# Patient Record
Sex: MALE | Race: WHITE | NOT HISPANIC OR LATINO | URBAN - METROPOLITAN AREA
[De-identification: names, ages, dates, MRNs, and addresses within clinical notes are randomized per-mention and may not be internally consistent; named-entity substitution may affect disease eponyms.]

---

## 2020-10-01 ENCOUNTER — OUTPATIENT (OUTPATIENT)
Dept: OUTPATIENT SERVICES | Facility: HOSPITAL | Age: 50
LOS: 1 days | End: 2020-10-01
Payer: COMMERCIAL

## 2020-10-01 ENCOUNTER — APPOINTMENT (OUTPATIENT)
Dept: RADIOLOGY | Facility: CLINIC | Age: 50
End: 2020-10-01
Payer: COMMERCIAL

## 2020-10-01 DIAGNOSIS — Z00.8 ENCOUNTER FOR OTHER GENERAL EXAMINATION: ICD-10-CM

## 2020-10-01 PROBLEM — Z00.00 ENCOUNTER FOR PREVENTIVE HEALTH EXAMINATION: Status: ACTIVE | Noted: 2020-10-01

## 2020-10-01 PROCEDURE — 73130 X-RAY EXAM OF HAND: CPT

## 2020-10-01 PROCEDURE — 73130 X-RAY EXAM OF HAND: CPT | Mod: 26,RT

## 2020-10-05 ENCOUNTER — LABORATORY RESULT (OUTPATIENT)
Age: 50
End: 2020-10-05

## 2020-10-05 ENCOUNTER — APPOINTMENT (OUTPATIENT)
Dept: ORTHOPEDIC SURGERY | Facility: CLINIC | Age: 50
End: 2020-10-05
Payer: COMMERCIAL

## 2020-10-05 VITALS — WEIGHT: 200 LBS | HEIGHT: 71 IN | BODY MASS INDEX: 28 KG/M2 | RESPIRATION RATE: 16 BRPM

## 2020-10-05 DIAGNOSIS — Z78.9 OTHER SPECIFIED HEALTH STATUS: ICD-10-CM

## 2020-10-05 DIAGNOSIS — Z87.891 PERSONAL HISTORY OF NICOTINE DEPENDENCE: ICD-10-CM

## 2020-10-05 PROCEDURE — 99203 OFFICE O/P NEW LOW 30 MIN: CPT

## 2020-10-05 PROCEDURE — 73130 X-RAY EXAM OF HAND: CPT | Mod: 50

## 2020-10-06 ENCOUNTER — TRANSCRIPTION ENCOUNTER (OUTPATIENT)
Age: 50
End: 2020-10-06

## 2020-10-06 RX ORDER — CEPHALEXIN 500 MG/1
500 CAPSULE ORAL 4 TIMES DAILY
Qty: 20 | Refills: 0 | Status: ACTIVE | COMMUNITY
Start: 2020-10-06 | End: 1900-01-01

## 2020-10-06 RX ORDER — ACETAMINOPHEN AND CODEINE 300; 30 MG/1; MG/1
300-30 TABLET ORAL
Qty: 12 | Refills: 0 | Status: ACTIVE | COMMUNITY
Start: 2020-10-06 | End: 1900-01-01

## 2020-10-07 ENCOUNTER — APPOINTMENT (OUTPATIENT)
Dept: ORTHOPEDIC SURGERY | Facility: AMBULATORY SURGERY CENTER | Age: 50
End: 2020-10-07
Payer: COMMERCIAL

## 2020-10-07 ENCOUNTER — OUTPATIENT (OUTPATIENT)
Dept: OUTPATIENT SERVICES | Facility: HOSPITAL | Age: 50
LOS: 1 days | Discharge: HOME | End: 2020-10-07

## 2020-10-07 PROCEDURE — 26608 TREAT METACARPAL FRACTURE: CPT | Mod: F9

## 2020-10-14 ENCOUNTER — APPOINTMENT (OUTPATIENT)
Dept: ORTHOPEDIC SURGERY | Facility: CLINIC | Age: 50
End: 2020-10-14
Payer: COMMERCIAL

## 2020-10-14 PROCEDURE — 99024 POSTOP FOLLOW-UP VISIT: CPT

## 2020-10-14 PROCEDURE — 29075 APPL CST ELBW FNGR SHORT ARM: CPT

## 2020-10-14 PROCEDURE — 73130 X-RAY EXAM OF HAND: CPT | Mod: RT

## 2020-11-05 ENCOUNTER — APPOINTMENT (OUTPATIENT)
Dept: ORTHOPEDIC SURGERY | Facility: CLINIC | Age: 50
End: 2020-11-05
Payer: COMMERCIAL

## 2020-11-05 VITALS — RESPIRATION RATE: 16 BRPM | HEIGHT: 71 IN | WEIGHT: 200 LBS | BODY MASS INDEX: 28 KG/M2

## 2020-11-05 DIAGNOSIS — S62.336D DISPLACED FRACTURE OF NECK OF FIFTH METACARPAL BONE, RIGHT HAND, SUBSEQUENT ENCOUNTER FOR FRACTURE WITH ROUTINE HEALING: ICD-10-CM

## 2020-11-05 PROCEDURE — 73130 X-RAY EXAM OF HAND: CPT | Mod: RT

## 2020-11-05 PROCEDURE — 20670 REMOVAL IMPLANT SUPERFICIAL: CPT | Mod: 58,RT

## 2020-11-05 PROCEDURE — 99024 POSTOP FOLLOW-UP VISIT: CPT

## 2022-02-24 ENCOUNTER — EMERGENCY (EMERGENCY)
Facility: HOSPITAL | Age: 52
LOS: 1 days | Discharge: HOME | End: 2022-02-24
Attending: PERSONAL EMERGENCY RESPONSE ATTENDANT
Payer: COMMERCIAL

## 2022-02-24 VITALS
TEMPERATURE: 98 F | RESPIRATION RATE: 18 BRPM | OXYGEN SATURATION: 98 % | WEIGHT: 199.96 LBS | SYSTOLIC BLOOD PRESSURE: 133 MMHG | HEART RATE: 63 BPM | DIASTOLIC BLOOD PRESSURE: 88 MMHG

## 2022-02-24 LAB
ALBUMIN SERPL ELPH-MCNC: 4.4 G/DL — SIGNIFICANT CHANGE UP (ref 3.3–5)
ALP SERPL-CCNC: 54 U/L — SIGNIFICANT CHANGE UP (ref 40–120)
ALT FLD-CCNC: 167 U/L — HIGH (ref 10–45)
ANION GAP SERPL CALC-SCNC: 14 MMOL/L — SIGNIFICANT CHANGE UP (ref 5–17)
AST SERPL-CCNC: 65 U/L — HIGH (ref 10–40)
BASOPHILS # BLD AUTO: 0.04 K/UL — SIGNIFICANT CHANGE UP (ref 0–0.2)
BASOPHILS NFR BLD AUTO: 0.6 % — SIGNIFICANT CHANGE UP (ref 0–2)
BILIRUB SERPL-MCNC: 0.4 MG/DL — SIGNIFICANT CHANGE UP (ref 0.2–1.2)
BUN SERPL-MCNC: 12 MG/DL — SIGNIFICANT CHANGE UP (ref 7–23)
CALCIUM SERPL-MCNC: 9.5 MG/DL — SIGNIFICANT CHANGE UP (ref 8.4–10.5)
CHLORIDE SERPL-SCNC: 103 MMOL/L — SIGNIFICANT CHANGE UP (ref 96–108)
CO2 SERPL-SCNC: 21 MMOL/L — LOW (ref 22–31)
CREAT SERPL-MCNC: 0.85 MG/DL — SIGNIFICANT CHANGE UP (ref 0.5–1.3)
EOSINOPHIL # BLD AUTO: 0.07 K/UL — SIGNIFICANT CHANGE UP (ref 0–0.5)
EOSINOPHIL NFR BLD AUTO: 1 % — SIGNIFICANT CHANGE UP (ref 0–6)
FLUAV AG NPH QL: SIGNIFICANT CHANGE UP
FLUBV AG NPH QL: SIGNIFICANT CHANGE UP
GLUCOSE SERPL-MCNC: 84 MG/DL — SIGNIFICANT CHANGE UP (ref 70–99)
HCT VFR BLD CALC: 46.7 % — SIGNIFICANT CHANGE UP (ref 39–50)
HGB BLD-MCNC: 15.5 G/DL — SIGNIFICANT CHANGE UP (ref 13–17)
IMM GRANULOCYTES NFR BLD AUTO: 0.6 % — SIGNIFICANT CHANGE UP (ref 0–1.5)
LIDOCAIN IGE QN: 26 U/L — SIGNIFICANT CHANGE UP (ref 7–60)
LYMPHOCYTES # BLD AUTO: 2.41 K/UL — SIGNIFICANT CHANGE UP (ref 1–3.3)
LYMPHOCYTES # BLD AUTO: 33.3 % — SIGNIFICANT CHANGE UP (ref 13–44)
MCHC RBC-ENTMCNC: 29.5 PG — SIGNIFICANT CHANGE UP (ref 27–34)
MCHC RBC-ENTMCNC: 33.2 GM/DL — SIGNIFICANT CHANGE UP (ref 32–36)
MCV RBC AUTO: 88.8 FL — SIGNIFICANT CHANGE UP (ref 80–100)
MONOCYTES # BLD AUTO: 0.55 K/UL — SIGNIFICANT CHANGE UP (ref 0–0.9)
MONOCYTES NFR BLD AUTO: 7.6 % — SIGNIFICANT CHANGE UP (ref 2–14)
NEUTROPHILS # BLD AUTO: 4.12 K/UL — SIGNIFICANT CHANGE UP (ref 1.8–7.4)
NEUTROPHILS NFR BLD AUTO: 56.9 % — SIGNIFICANT CHANGE UP (ref 43–77)
NRBC # BLD: 0 /100 WBCS — SIGNIFICANT CHANGE UP (ref 0–0)
PLATELET # BLD AUTO: 205 K/UL — SIGNIFICANT CHANGE UP (ref 150–400)
POTASSIUM SERPL-MCNC: 4.2 MMOL/L — SIGNIFICANT CHANGE UP (ref 3.5–5.3)
POTASSIUM SERPL-SCNC: 4.2 MMOL/L — SIGNIFICANT CHANGE UP (ref 3.5–5.3)
PROT SERPL-MCNC: 6.8 G/DL — SIGNIFICANT CHANGE UP (ref 6–8.3)
RBC # BLD: 5.26 M/UL — SIGNIFICANT CHANGE UP (ref 4.2–5.8)
RBC # FLD: 12.5 % — SIGNIFICANT CHANGE UP (ref 10.3–14.5)
RSV RNA NPH QL NAA+NON-PROBE: SIGNIFICANT CHANGE UP
SARS-COV-2 RNA SPEC QL NAA+PROBE: SIGNIFICANT CHANGE UP
SODIUM SERPL-SCNC: 138 MMOL/L — SIGNIFICANT CHANGE UP (ref 135–145)
TROPONIN T, HIGH SENSITIVITY RESULT: <6 NG/L — SIGNIFICANT CHANGE UP (ref 0–51)
TROPONIN T, HIGH SENSITIVITY RESULT: <6 NG/L — SIGNIFICANT CHANGE UP (ref 0–51)
WBC # BLD: 7.23 K/UL — SIGNIFICANT CHANGE UP (ref 3.8–10.5)
WBC # FLD AUTO: 7.23 K/UL — SIGNIFICANT CHANGE UP (ref 3.8–10.5)

## 2022-02-24 PROCEDURE — 71046 X-RAY EXAM CHEST 2 VIEWS: CPT | Mod: 26

## 2022-02-24 PROCEDURE — 99220: CPT

## 2022-02-24 PROCEDURE — 93010 ELECTROCARDIOGRAM REPORT: CPT | Mod: 59

## 2022-02-24 RX ORDER — FAMOTIDINE 10 MG/ML
20 INJECTION INTRAVENOUS ONCE
Refills: 0 | Status: COMPLETED | OUTPATIENT
Start: 2022-02-24 | End: 2022-02-24

## 2022-02-24 RX ORDER — ASPIRIN/CALCIUM CARB/MAGNESIUM 324 MG
162 TABLET ORAL ONCE
Refills: 0 | Status: COMPLETED | OUTPATIENT
Start: 2022-02-24 | End: 2022-02-24

## 2022-02-24 RX ADMIN — FAMOTIDINE 20 MILLIGRAM(S): 10 INJECTION INTRAVENOUS at 16:07

## 2022-02-24 NOTE — ED CDU PROVIDER DISPOSITION NOTE - PATIENT PORTAL LINK FT
You can access the FollowMyHealth Patient Portal offered by [facility] by registering at the following website: [email or web address]. By joining [facility]’s FollowMyHealth portal, you will also be able to view your health information using other applications (apps) compatible with our system.

## 2022-02-24 NOTE — ED CDU PROVIDER DISPOSITION NOTE - ATTENDING CONTRIBUTION TO CARE
Seen in [facility] CDU 50    51M with PMH/PSH including HLD, former tobacco sent to the CDU after presenting to the ED with chest pressure.  Reports chest pressure improved but not resolved.  Reports initial chest pressure accompanied by lightheadedness, weakness going up stairs and tingling down bilateral UEs to ring and pinky fingers.  Reports still having intermittent chest pressure, although improved.  Denies shortness of breath, palpitations, UEs pain, neck pain, jaw pain, leg swelling.  Denies abdominal pain, nausea, vomiting, diarrhea.  Denies urinary complaints.  Denies fevers.  A ten (10) point review of systems was negative other than as stated in the HPI or elsewhere in the chart.    Exam:   General: NAD  HENT: head NCAT, airway patent  Eyes: anicteric, no conjunctival injection   Lungs: lungs CTAB with good inspiratory effort, no wheezing, no rhonchi, no rales  Cardiac: +S1S2, no m/r/g  GI: abdomen soft with +BS, NT, ND  : no CVAT  MSK: FROM at neck, no tenderness to midline palpation, no calf tenderness, swelling, erythema or warmth  Neuro: moving all extremities spontaneously, sensory grossly intact, no gross neuro deficits  Psych: normal mood and affect     A/P: 51M with chest pressure at rest, CT coronary non actionable with mild CAD, no events on tele, seen by Cards, patient to follow up with outpatient cards.  No acute issues at  this time.  Lab and radiology tests reviewed with patient.  Patient stable for discharge. Follow up instructions given, importance of follow up emphasized, return to ED parameters reviewed and patient verbalized understanding.  All questions answered, all concerns addressed.

## 2022-02-24 NOTE — ED ADULT NURSE NOTE - NSIMPLEMENTINTERV_GEN_ALL_ED
Implemented All Universal Safety Interventions:  [place] to call system. Call bell, personal items and telephone within reach. Instruct patient to call for assistance. Room bathroom lighting operational. Non-slip footwear when patient is off stretcher. Physically safe environment: no spills, clutter or unnecessary equipment. Stretcher in lowest position, wheels locked, appropriate side rails in place.

## 2022-02-24 NOTE — ED CDU PROVIDER INITIAL DAY NOTE - OBJECTIVE STATEMENT
52 yo male a PMH of HLD (not on meds) 10 pack yr smoker over 15 yrs ago and FH of MI in father at 45 who presents with 2 days of midsternal chest pressure that radiates to the right, is constant, nonexertional, associated with indigestion and dizziness. Sxs are intermittent and come/go w/o provoking factors. Denies any sob, no diaphoreses, n/v. no f/c. Pt took aspirin today and yesterday. Has never been evaluated by Cards. 52 yo male a PMH of HLD (not on meds) 10 pack yr smoker over 15 yrs ago and FH of MI in father at 45 who presents with 2 days of midsternal chest pressure that radiates to the right, is constant, nonexertional, associated with indigestion and dizziness. Sxs are intermittent and come/go w/o provoking factors. Denies any sob, no diaphoreses. Denies nausea, vomiting, fever, chills, abd pain, headache, dizziness, syncope.  Pt took aspirin today and yesterday. Has never been evaluated by Cards. Denies drugs or alcohol use.

## 2022-02-24 NOTE — ED ADULT NURSE NOTE - OBJECTIVE STATEMENT
pt is a 51yoM complaining of chest pressure that started yesterday after leaving work. 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 States he was not doing anything when it started and also felt some tingling in his hands. Denies SOB, smoking or drinking history. Does not take any medications. Pt is AAOX4 and appears well.

## 2022-02-24 NOTE — ED ADULT NURSE REASSESSMENT NOTE - COMFORT CARE
ambulated to bathroom/darkened lights/meal provided/plan of care explained/po fluids offered/repositioned/side rails up/warm blanket provided

## 2022-02-24 NOTE — ED CDU PROVIDER INITIAL DAY NOTE - ATTENDING CONTRIBUTION TO CARE
Attending MD Suarez.  Agree with above.  Pt is an otherwise healthy 52 yo male with complaint of ~2 days chest pressure radiating from mid-sternum to R side of chest/R arm assoc with light-headedness.  No diaphoresis, no n/v.  Exam reassuring, non-actionable.  Pt endorses ongoing chest pressure in ED.  Pt feels some sense of self-described 'indigestion' without noted reflux.  Pt endorses 10 pack year hx, quit tobacco >14 yrs ago.  Pt's father had first heart attack at 46 yo.    Prolonged discussion with pt re: family hx and risk and pt amenable to CDU stay for probable stress vs. CT coronaries tomorrow and cards doc of day eval in AM.  Pt hemodynamically stable for transfer of care to CDU.

## 2022-02-24 NOTE — ED ADULT NURSE REASSESSMENT NOTE - NS ED NURSE REASSESS COMMENT FT1
Pt. received from ED JONA Kaur. Pt. A&Ox4 resting in bed comfortably. Pt. respirations even and unlabored. Pt. abdomen soft, nondistended & nontender. Pt. skin warm dry intact appropriate for ethnicity. Pt. ambulates with observed steady gait. Pt. came to the ED complaining of midsternal chest pressure/indigestion x1 day. Pt. states pressure started last night, radiates to the right arm w/ tingling in the fingers.   Pt. remains in CDU pending CTC in AM. Pt. has no complaints of pain or discomfort at this time. Denies nausea/vomiting/abdominal pain/chest pain/pressure/SOB. L Hand/ R A/C IV secured and patent. Pt. NSR on cardiac monitor. Vitals remain stable - view flowsheet. Repeat labs to be drawn in the AM. Medications to be administered as ordered. Call bell within reach encouraged to call for assistance when needed. Side rails remain up for pt. safety. Will continue to monitor and reassess. Pt. received from ED JONA Kaur. Pt. A&Ox4 resting in bed comfortably. Pt. respirations even and unlabored. Pt. abdomen soft, nondistended & nontender. Pt. skin warm dry intact appropriate for ethnicity. Pt. ambulates with observed steady gait. Pt. came to the ED complaining of midsternal chest pressure/indigestion x1 day. Pt. states pressure started last night, radiates to the right arm w/ tingling in the fingers. Pt. remains in CDU pending CTC in AM. Pt. has no complaints of pain or discomfort at this time. Denies nausea/vomiting/abdominal pain/chest pain/pressure/SOB. L Hand/ R A/C IV secured and patent. Pt. NSR on cardiac monitor. Vitals remain stable - view flowsheet. Repeat labs to be drawn in the AM. Medications to be administered as ordered. Call bell within reach encouraged to call for assistance when needed. Side rails remain up for pt. safety. Will continue to monitor and reassess. Pt. received from ED JONA Kaur. Pt. A&Ox4 resting in bed comfortably. Pt. respirations even and unlabored. Pt. abdomen soft, nondistended & nontender. Pt. skin warm dry intact appropriate for ethnicity. Pt. ambulates with observed steady gait. Pt. came to the ED complaining of midsternal chest pressure/indigestion x1 day. Pt. states pressure started last night, radiates to the right arm w/ tingling in the fingers. Pt. remains in CDU pending CTC in AM. Pt. has no complaints of pain or discomfort at this time. Denies nausea/vomiting/abdominal pain/chest pain/pressure/SOB. L Hand/L A/C IV secured and patent. Pt. NSR on cardiac monitor. Vitals remain stable - view flowsheet. Repeat labs to be drawn in the AM. Medications to be administered as ordered. Call bell within reach encouraged to call for assistance when needed. Side rails remain up for pt. safety. Will continue to monitor and reassess.

## 2022-02-24 NOTE — ED CDU PROVIDER DISPOSITION NOTE - NS ED ATTENDING STATEMENT MOD
This was a shared visit with the AGA. I reviewed and verified the documentation and independently performed the documented:

## 2022-02-24 NOTE — ED CDU PROVIDER INITIAL DAY NOTE - MEDICAL DECISION MAKING DETAILS
chest pain r/o ACS  heart score of 3, will benefit from additional cardiac testing including CT coronaries  Will keep on telemetry   Pain control PRN  Cards consult in AM

## 2022-02-24 NOTE — ED PROVIDER NOTE - ATTENDING CONTRIBUTION TO CARE
Attending MD Suarez.  Agree with above.  Pt is an otherwise healthy 50 yo male with complaint of ~2 days chest pressure radiating from mid-sternum to R side of chest/R arm assoc with light-headedness.  No diaphoresis, no n/v.  Exam reassuring, non-actionable.  Pt endorses ongoing chest pressure in ED.  Pt feels some sense of self-described 'indigestion' without noted reflux. Attending MD Suarez.  Agree with above.  Pt is an otherwise healthy 50 yo male with complaint of ~2 days chest pressure radiating from mid-sternum to R side of chest/R arm assoc with light-headedness.  No diaphoresis, no n/v.  Exam reassuring, non-actionable.  Pt endorses ongoing chest pressure in ED.  Pt feels some sense of self-described 'indigestion' without noted reflux.  Pt endorses 10 pack year hx, quit tobacco >14 yrs ago.  Pt's father had first heart attack at 46 yo. Attending MD Suarez.  Agree with above.  Pt is an otherwise healthy 50 yo male with complaint of ~2 days chest pressure radiating from mid-sternum to R side of chest/R arm assoc with light-headedness.  No diaphoresis, no n/v.  Exam reassuring, non-actionable.  Pt endorses ongoing chest pressure in ED.  Pt feels some sense of self-described 'indigestion' without noted reflux.  Pt endorses 10 pack year hx, quit tobacco >14 yrs ago.  Pt's father had first heart attack at 44 yo.    Prolonged discussion with pt re: family hx and risk and pt amenable to CDU stay for probable stress vs. CT coronaries tomorrow and cards doc of day eval in AM.  Pt hemodynamically stable for transfer of care to CDU.

## 2022-02-24 NOTE — ED PROVIDER NOTE - PHYSICAL EXAMINATION
Const: Well-nourished, Well-developed, appearing stated age.  Eyes: no conjunctival injection, and symmetrical lids.  HEENT: Head NCAT, no lesions. Atraumatic external nose and ears. Moist MM.  Neck: Symmetric, trachea midline.   CVS: +S1/S2,  RESP: Unlabored respiratory effort. Clear to auscultation bilaterally.  GI: Nontender/Nondistended, No CVA tenderness b/l.   MSK: Normocephalic/Atraumatic, Lower Extremities w/o calf tenderness or edema b/l.   Skin: Warm, dry and intact.   Neuro: Motor & Sensation grossly intact.  Psych: Awake, Alert, & Oriented (AAO) x3. Appropriate mood and affect.

## 2022-02-24 NOTE — ED PROVIDER NOTE - CLINICAL SUMMARY MEDICAL DECISION MAKING FREE TEXT BOX
Pt is a 50 yo male no PMH 10 pack yr smoker over 15 yrs ago and FH of MI in father at 45 who presents with 2 days of midsternal chest pressure. R/o ACS. Labs, ecg, img.

## 2022-02-24 NOTE — ED ADULT TRIAGE NOTE - WEIGHT IN KG
90.7
Mother  Still living? No  Family history of diabetes mellitus, Age at diagnosis: Age Unknown     Father  Still living? No  Family history of lung cancer, Age at diagnosis: Age Unknown

## 2022-02-24 NOTE — ED CDU PROVIDER DISPOSITION NOTE - NSFOLLOWUPINSTRUCTIONS_ED_ALL_ED_FT
You were seen and evaluated in the ED for chest pain.   Please make sure to follow up with your primary care doctor within 1-2 days and with the Cardiology specialist. The information for follow up can be found below. Bring a copy of all of your results with you to your follow up appointments.   Return to the ER as discussed if you develop any new or worsening symptoms.        CARDS INFO***** 1. Stay hydrated.    2. Start Aspirin 81mg daily. Start Lipitor 80mg daily.     3. Follow up with your PCP or medicine clinic [phone] in 1-2 days (Bring printed results to your doctor visit).  Follow up with Cardiology:    4. Return if symptoms, worsen, fever, weakness, chest pain, difficulty breathing, dizziness and all other concerns.    **your hemoglobin A1c puts you in the pre-diabetic range, please follow low carb/sugar diet***  ***your cholesterol is very high here, please take lipitor as prescribed and follow low cholesterol diet***  **your liver enzymes are elevated here, please follow up with your primary care physician or our medicine clinic***  **on CT scan, you were found to have coronary artery disease, a small patent foramen ovale and non-calcified plaque in the aorta, please follow up with your doctor or our doctors recommended to you*** 1. Stay hydrated. Eat healthy, exercise.     2. Start Aspirin 81mg daily. Start Lipitor 40mg daily.     3. Follow up with your PCP or medicine clinic [phone] in 1-2 days (Bring printed results to your doctor visit).  Follow up with a Cardiologist in [place] where you live, in 5-7 days.     4. Return if symptoms, worsen, fever, weakness, chest pain, difficulty breathing, dizziness and all other concerns.    **your hemoglobin A1c puts you in the pre-diabetic range, please follow low carb/sugar diet***  ***your cholesterol is very high here, please take lipitor as prescribed and follow low cholesterol diet***  **your liver enzymes are elevated here, please follow up with your primary care physician or our medicine clinic***  **on CT scan, you were found to have coronary artery disease, a small patent foramen ovale and non-calcified plaque in the aorta, please follow up with your doctor or our doctors recommended to you*** 1. Stay hydrated. Eat healthy, exercise.     2. Start Lipitor 40mg daily. **your liver enzymes are elevated, while on lipitor this could worsen your liver enzyme level, please be closely monitored by your doctor***    3. Follow up with your PCP or medicine clinic [phone] in 1-2 days (Bring printed results to your doctor visit).  Follow up with a Cardiologist in [place] where you live, in 5-7 days.     4. Return if symptoms, worsen, fever, weakness, chest pain, difficulty breathing, dizziness and all other concerns.    **your hemoglobin A1c puts you in the pre-diabetic range, please follow low carb/sugar diet***  ***your cholesterol is very high here, please take lipitor as prescribed and follow low cholesterol diet***  **your liver enzymes are elevated here, please follow up with your primary care physician or our medicine clinic***  **on CT scan, you were found to have coronary artery disease, a small patent foramen ovale and non-calcified plaque in the aorta, please follow up with your doctor or our doctors recommended to you***

## 2022-02-24 NOTE — ED CDU PROVIDER INITIAL DAY NOTE - NSICDXFAMILYHX_GEN_ALL_CORE_FT
FAMILY HISTORY:  Father  Still living? Unknown  Family history of MI (myocardial infarction), Age at diagnosis: 41-50

## 2022-02-24 NOTE — ED PROVIDER NOTE - NS ED ROS FT
CONST: no fevers, no chills, no trauma  EYES: no pain, no blurry vision   ENT: no sore throat, no epistaxis, no rhinorrhea  CV: +chest pain, no palpitations, no orthopnea, no extremity pain or swelling  RESP: no shortness of breath, no cough, no sputum, no pleurisy, no wheezing  ABD: no abdominal pain, no nausea, no vomiting, no diarrhea, no black or bloody stool  : no dysuria, no hematuria, no frequency, no urgency  MSK: no back pain, no neck pain, no extremity pain  NEURO: no headache, no sensory disturbances, no focal weakness, +dizziness  HEME: no easy bleeding or bruising  SKIN: no diaphoresis, no rash

## 2022-02-24 NOTE — ED CDU PROVIDER DISPOSITION NOTE - CLINICAL COURSE
50 yo male a PMH of HLD (not on meds) 10 pack yr smoker over 15 yrs ago and FH of MI in father at 45 who presents with 2 days of midsternal chest pressure that radiates to the right, is constant, nonexertional, associated with indigestion and dizziness. Sxs are intermittent and come/go w/o provoking factors. Denies any sob, no diaphoreses, n/v. no f/c. Pt took aspirin today and yesterday. Has never been evaluated by Cards.  In the ED, pt with stable VS. Labs remarkable for elevated AST/ALT (no abd pain/abd soft, non-tender, no hepatomegaly) and trop negative x 1. CXR w/o acute findings. Pt w/ active chest pain was given ASA 162mg. Repeat trop pending. Plan to place pt in CDU for additional cardiac w/u including tele monitoring and CT coronaries as well as Cards consult in the AM.  In the CDU ***** 50 yo male a PMH of HLD (not on meds) 10 pack yr smoker over 15 yrs ago and FH of MI in father at 45 who presents with 2 days of midsternal chest pressure that radiates to the right, is constant, nonexertional, associated with indigestion and dizziness. Sxs are intermittent and come/go w/o provoking factors. Denies any sob, no diaphoreses, n/v. no f/c. Pt took aspirin today and yesterday. Has never been evaluated by Cards.  In the ED, pt with stable VS. Labs remarkable for elevated AST/ALT (no abd pain/abd soft, non-tender, no hepatomegaly) and trop negative x 1. CXR w/o acute findings. Pt w/ active chest pain was given ASA 162mg. Repeat trop pending. Plan to place pt in CDU for additional cardiac w/u including tele monitoring and CT coronaries as well as Cards consult in the AM.  In the CDU pt did well, no events on tele, CTC- no acute/emergent findings, mild CAD. called cards for consult- seen by ginger Louie to f/up outpt

## 2022-02-24 NOTE — ED PROVIDER NOTE - OBJECTIVE STATEMENT
Pt is a 50 yo male no PMH 10 pack yr smoker over 15 yrs ago and FH of MI in father at 45 who presents with 2 days of midsternal chest pressure that radiates to the right, is constant, nonexertional, associated with indigestion and dizziness. Denies any sob, no diaphoreses, n/v. no f/c. Pt took aspirin today and yesterday.

## 2022-02-24 NOTE — ED PROVIDER NOTE - PROGRESS NOTE DETAILS
Sean Hickey, PGY1 Reassed pt. Continues to feel chest pressure at this time. Labs and ecg wnl. Sean Hickey, PGY1 josé miguel admit to cdu for stress and echo

## 2022-02-25 VITALS
HEART RATE: 71 BPM | RESPIRATION RATE: 17 BRPM | TEMPERATURE: 98 F | DIASTOLIC BLOOD PRESSURE: 72 MMHG | OXYGEN SATURATION: 96 % | SYSTOLIC BLOOD PRESSURE: 113 MMHG

## 2022-02-25 LAB
A1C WITH ESTIMATED AVERAGE GLUCOSE RESULT: 6.2 % — HIGH (ref 4–5.6)
CHOLEST SERPL-MCNC: 304 MG/DL — HIGH
ESTIMATED AVERAGE GLUCOSE: 131 MG/DL — HIGH (ref 68–114)
HDLC SERPL-MCNC: 45 MG/DL — SIGNIFICANT CHANGE UP
LIPID PNL WITH DIRECT LDL SERPL: 226 MG/DL — HIGH
NON HDL CHOLESTEROL: 260 MG/DL — HIGH
TRIGL SERPL-MCNC: 170 MG/DL — HIGH

## 2022-02-25 PROCEDURE — 83036 HEMOGLOBIN GLYCOSYLATED A1C: CPT

## 2022-02-25 PROCEDURE — 85025 COMPLETE CBC W/AUTO DIFF WBC: CPT

## 2022-02-25 PROCEDURE — 96374 THER/PROPH/DIAG INJ IV PUSH: CPT | Mod: XU

## 2022-02-25 PROCEDURE — 99218: CPT

## 2022-02-25 PROCEDURE — G0378: CPT

## 2022-02-25 PROCEDURE — 75574 CT ANGIO HRT W/3D IMAGE: CPT | Mod: 26,MA

## 2022-02-25 PROCEDURE — 99285 EMERGENCY DEPT VISIT HI MDM: CPT | Mod: 25

## 2022-02-25 PROCEDURE — 75574 CT ANGIO HRT W/3D IMAGE: CPT | Mod: MA

## 2022-02-25 PROCEDURE — 80061 LIPID PANEL: CPT

## 2022-02-25 PROCEDURE — 99217: CPT | Mod: FS

## 2022-02-25 PROCEDURE — 84484 ASSAY OF TROPONIN QUANT: CPT

## 2022-02-25 PROCEDURE — 36415 COLL VENOUS BLD VENIPUNCTURE: CPT

## 2022-02-25 PROCEDURE — 83690 ASSAY OF LIPASE: CPT

## 2022-02-25 PROCEDURE — 87637 SARSCOV2&INF A&B&RSV AMP PRB: CPT

## 2022-02-25 PROCEDURE — 71046 X-RAY EXAM CHEST 2 VIEWS: CPT

## 2022-02-25 PROCEDURE — 80053 COMPREHEN METABOLIC PANEL: CPT

## 2022-02-25 PROCEDURE — 93005 ELECTROCARDIOGRAM TRACING: CPT | Mod: XU

## 2022-02-25 RX ORDER — SODIUM CHLORIDE 9 MG/ML
1000 INJECTION INTRAMUSCULAR; INTRAVENOUS; SUBCUTANEOUS ONCE
Refills: 0 | Status: COMPLETED | OUTPATIENT
Start: 2022-02-25 | End: 2022-02-25

## 2022-02-25 RX ORDER — ATORVASTATIN CALCIUM 80 MG/1
1 TABLET, FILM COATED ORAL
Qty: 14 | Refills: 0
Start: 2022-02-25 | End: 2022-03-10

## 2022-02-25 RX ORDER — ACETAMINOPHEN 500 MG
975 TABLET ORAL ONCE
Refills: 0 | Status: COMPLETED | OUTPATIENT
Start: 2022-02-25 | End: 2022-02-25

## 2022-02-25 RX ADMIN — Medication 975 MILLIGRAM(S): at 14:22

## 2022-02-25 RX ADMIN — SODIUM CHLORIDE 1000 MILLILITER(S): 9 INJECTION INTRAMUSCULAR; INTRAVENOUS; SUBCUTANEOUS at 11:51

## 2022-02-25 NOTE — ED CDU PROVIDER SUBSEQUENT DAY NOTE - ATTENDING CONTRIBUTION TO CARE
Attending MD Garcia:   I personally have seen and examined this patient.  Physician assistant note reviewed and agree on plan of care and except where noted.  See below for details.     Seen in [facility] CDU 49    51M with PMH/PSH including HLD, former tobacco sent to the CDU after presenting to the ED with chest pressure.  Reports chest pressure improved but not resolved.  Reports initial chest pressure accompanied by lightheadedness, weakness going up stairs and tingling down bilateral UEs to ring and pinky fingers.  Reports still having intermittent chest pressure, although improved.  Denies shortness of breath, palpitations, UEs pain, neck pain, jaw pain, leg swelling.  Denies abdominal pain, nausea, vomiting, diarrhea.  Denies urinary complaints.  Denies fevers.  A ten (10) point review of systems was negative other than as stated in the HPI or elsewhere in the chart.    Exam:   General: NAD  HENT: head NCAT, airway patent  Eyes: anicteric, no conjunctival injection   Lungs: lungs CTAB with good inspiratory effort, no wheezing, no rhonchi, no rales  Cardiac: +S1S2, no m/r/g  GI: abdomen soft with +BS, NT, ND  : no CVAT  MSK: FROM at neck, no tenderness to midline palpation, no calf tenderness, swelling, erythema or warmth  Neuro: moving all extremities spontaneously, sensory grossly intact, no gross neuro deficits  Psych: normal mood and affect     A/P: 51M with chest pressure at rest, pending CT coronary and Cards consult, will await.  Patient amenable to plan, will await.

## 2022-02-25 NOTE — ED CDU PROVIDER SUBSEQUENT DAY NOTE - HISTORY
Patient seen at bedside in NAD.  VSS.  Patient resting comfortably without complaints. No chest pain at present. Awaiting CT coronaries and Cards consult in the AM. Will continue to monitor.

## 2022-02-25 NOTE — ED ADULT NURSE REASSESSMENT NOTE - NS ED NURSE REASSESS COMMENT FT1
1108 pert Ernesto from Cardiac ct/ given nitro 0.8 and metropolol 10 mg ivp/vitals wnl 106/50 given iv fluids 100cc

## 2022-02-25 NOTE — CONSULT NOTE ADULT - SUBJECTIVE AND OBJECTIVE BOX
CC:  Chest pressure    HPI:  Mr. Morrison is a 51-year-old man and dyslipidemia (diet controlled), prior tobacco use and family history of premature coronary artery disease who presents to the hospital with constant chest pressure starting on Wednesday evening while driving home for work associated with lightheadedness.  The following morning the patient noted tingling in his arms bilaterally and frequent belching.  At the current time he denies any chest discomfort.    ROS:  Negative for dyspnea, orthopnea, PND, lower extremity edema.    FH:  Father (smoker) MI at 45    SH:  Former tobacco  2 EtOH/month  No ID   at [employer]    Medications:  None    NKDA    Labs, ECG, tele and CCTA reviewed    Impression:  Non-obstructive CAD  Dyslipidemia  Pre-DM  LFT abnormality    Plan:  Start atorvastatin 40 mg q day with close monitoring of LFT    Moderate physical activity     Follow-up with PMD and cardiologist

## 2022-02-25 NOTE — ED CDU PROVIDER SUBSEQUENT DAY NOTE - PROGRESS NOTE DETAILS
CDU NOTE RICKY Groves: pt resting, chest pressure improved- mild now. no other complaints. NAD VSS. no events on tele.  pt awaiting CTC CDU NOTE RICKY Groves: pt resting comfortably, chest pressure improved even from this morning, very mild and intermittent. pt does report mild headache. no other complaints. NAD VSS. tylenol ordered.  HA likely from nitro by CT Coronary suite. CDU NOTE RICKY Groves: CTC- mild disease, +myocardial bridging +small PFO. called Cards Dr. Chavis for consult. CDU NOTE RICKY Groves: as per Dr. Chavis (cardiologist) ok to d/c home on lipitor 40mg daily. will send pt on Lipitor 40mg daily and Aspirin 81mg daily. pt to f/up with his pcp in [place] and a Cardiologist in [place]

## 2022-07-28 NOTE — ED ADULT NURSE NOTE - DATE OF FIRST COVID-19 BOOSTER
Be able to verbalize an understanding of the association between substance abuse and mental health
Be able to verbalize an understanding of the association between substance abuse and mental health
24-Nov-2021

## 2024-10-17 NOTE — ED CDU PROVIDER SUBSEQUENT DAY NOTE - WET READ LAUNCH FT
There are no Wet Read(s) to document.
Detail Level: Detailed
Quality 47: Advance Care Plan: Advance Care Planning discussed and documented in the medical record; patient did not wish or was not able to name a surrogate decision maker or provide an advance care plan.
Quality 226: Preventive Care And Screening: Tobacco Use: Screening And Cessation Intervention: Patient screened for tobacco use and is an ex/non-smoker
Quality 130: Documentation Of Current Medications In The Medical Record: Current Medications Documented
